# Patient Record
Sex: MALE | Race: WHITE | NOT HISPANIC OR LATINO | URBAN - NONMETROPOLITAN AREA
[De-identification: names, ages, dates, MRNs, and addresses within clinical notes are randomized per-mention and may not be internally consistent; named-entity substitution may affect disease eponyms.]

---

## 2020-12-02 ENCOUNTER — OFFICE VISIT (OUTPATIENT)
Dept: URBAN - NONMETROPOLITAN AREA CLINIC 2 | Facility: CLINIC | Age: 75
End: 2020-12-02
Payer: MEDICARE

## 2020-12-02 DIAGNOSIS — Z12.11 COLON CANCER SCREENING: ICD-10-CM

## 2020-12-02 DIAGNOSIS — K22.70 BARRETT ESOPHAGUS: ICD-10-CM

## 2020-12-02 PROCEDURE — 1036F TOBACCO NON-USER: CPT | Performed by: INTERNAL MEDICINE

## 2020-12-02 PROCEDURE — 99213 OFFICE O/P EST LOW 20 MIN: CPT | Performed by: INTERNAL MEDICINE

## 2020-12-02 PROCEDURE — G8482 FLU IMMUNIZE ORDER/ADMIN: HCPCS | Performed by: INTERNAL MEDICINE

## 2020-12-02 RX ORDER — AMLODIPINE BESYLATE 2.5 MG/1
TABLET ORAL
Qty: 0 | Refills: 0 | Status: ACTIVE | COMMUNITY
Start: 1900-01-01

## 2020-12-02 RX ORDER — OMEPRAZOLE 20 MG/1
TAKE 1 CAPSULE BY ORAL ROUTE 2 TIMES A DAY FOR 90 DAYS CAPSULE, DELAYED RELEASE ORAL 2
Qty: 180 | Refills: 3 | Status: ACTIVE | COMMUNITY
Start: 2020-03-02 | End: 2021-02-25

## 2020-12-02 NOTE — HPI-TODAY'S VISIT:
Mr. Rogers returns for follow-up today.  He was scheduled for upper endoscopy earlier this year for surveillance of Flores's esophagus.  This was delayed because of Covid.  He remains on omeprazole.  He is doing well.  He denies any dysphagia.  He has a history of RFA by Dr. Gil at [place].  In addition he states he is also due for colonoscopy.  This was last done 10 to 12 years ago.

## 2020-12-21 ENCOUNTER — OFFICE VISIT (OUTPATIENT)
Dept: URBAN - NONMETROPOLITAN AREA SURGERY CENTER 1 | Facility: SURGERY CENTER | Age: 75
End: 2020-12-21

## 2021-01-14 ENCOUNTER — OFFICE VISIT (OUTPATIENT)
Dept: URBAN - NONMETROPOLITAN AREA CLINIC 2 | Facility: CLINIC | Age: 76
End: 2021-01-14

## 2021-05-06 ENCOUNTER — TELEPHONE ENCOUNTER (OUTPATIENT)
Dept: URBAN - METROPOLITAN AREA SURGERY CENTER 30 | Facility: SURGERY CENTER | Age: 76
End: 2021-05-06

## 2021-05-06 RX ORDER — OMEPRAZOLE 20 MG/1
TAKE 1 CAPSULE BY ORAL ROUTE 2 TIMES A DAY FOR 90 DAYS CAPSULE, DELAYED RELEASE ORAL TWICE A DAY
Qty: 180 | Refills: 3
Start: 2020-03-02

## 2021-06-14 ENCOUNTER — OFFICE VISIT (OUTPATIENT)
Dept: URBAN - NONMETROPOLITAN AREA SURGERY CENTER 1 | Facility: SURGERY CENTER | Age: 76
End: 2021-06-14
Payer: MEDICARE

## 2021-06-14 ENCOUNTER — CLAIMS CREATED FROM THE CLAIM WINDOW (OUTPATIENT)
Dept: URBAN - METROPOLITAN AREA CLINIC 4 | Facility: CLINIC | Age: 76
End: 2021-06-14
Payer: MEDICARE

## 2021-06-14 DIAGNOSIS — K22.70 BARRETT ESOPHAGUS: ICD-10-CM

## 2021-06-14 DIAGNOSIS — K22.719 BARRETT'S ESOPHAGUS WITH DYSPLASIA, UNSPECIFIED: ICD-10-CM

## 2021-06-14 DIAGNOSIS — Z12.11 COLON CANCER SCREENING: ICD-10-CM

## 2021-06-14 PROCEDURE — G0121 COLON CA SCRN NOT HI RSK IND: HCPCS | Performed by: INTERNAL MEDICINE

## 2021-06-14 PROCEDURE — 88313 SPECIAL STAINS GROUP 2: CPT | Performed by: PATHOLOGY

## 2021-06-14 PROCEDURE — G8907 PT DOC NO EVENTS ON DISCHARG: HCPCS | Performed by: INTERNAL MEDICINE

## 2021-06-14 PROCEDURE — 88305 TISSUE EXAM BY PATHOLOGIST: CPT | Performed by: PATHOLOGY

## 2021-06-14 PROCEDURE — 43239 EGD BIOPSY SINGLE/MULTIPLE: CPT | Performed by: INTERNAL MEDICINE

## 2022-06-07 ENCOUNTER — TELEPHONE ENCOUNTER (OUTPATIENT)
Dept: URBAN - NONMETROPOLITAN AREA CLINIC 2 | Facility: CLINIC | Age: 77
End: 2022-06-07

## 2022-06-07 RX ORDER — OMEPRAZOLE 20 MG/1
1 CAPSULE 30 MINUTES BEFORE MEALS CAPSULE, DELAYED RELEASE ORAL TWICE A DAY
Qty: 180 | Refills: 3
Start: 2020-03-02

## 2022-08-10 ENCOUNTER — WEB ENCOUNTER (OUTPATIENT)
Dept: URBAN - NONMETROPOLITAN AREA CLINIC 13 | Facility: CLINIC | Age: 77
End: 2022-08-10

## 2022-08-16 ENCOUNTER — WEB ENCOUNTER (OUTPATIENT)
Dept: URBAN - NONMETROPOLITAN AREA CLINIC 13 | Facility: CLINIC | Age: 77
End: 2022-08-16

## 2022-08-16 ENCOUNTER — DASHBOARD ENCOUNTERS (OUTPATIENT)
Age: 77
End: 2022-08-16

## 2022-08-16 ENCOUNTER — OFFICE VISIT (OUTPATIENT)
Dept: URBAN - NONMETROPOLITAN AREA CLINIC 13 | Facility: CLINIC | Age: 77
End: 2022-08-16
Payer: MEDICARE

## 2022-08-16 VITALS
SYSTOLIC BLOOD PRESSURE: 167 MMHG | BODY MASS INDEX: 23.08 KG/M2 | HEIGHT: 66 IN | DIASTOLIC BLOOD PRESSURE: 81 MMHG | HEART RATE: 71 BPM | WEIGHT: 143.6 LBS

## 2022-08-16 DIAGNOSIS — K22.70 BARRETT ESOPHAGUS: ICD-10-CM

## 2022-08-16 DIAGNOSIS — Z12.11 COLON CANCER SCREENING: ICD-10-CM

## 2022-08-16 PROCEDURE — 99213 OFFICE O/P EST LOW 20 MIN: CPT | Performed by: NURSE PRACTITIONER

## 2022-08-16 RX ORDER — AMLODIPINE BESYLATE 2.5 MG/1
TABLET ORAL
Qty: 0 | Refills: 0 | Status: ACTIVE | COMMUNITY
Start: 1900-01-01

## 2022-08-16 RX ORDER — OMEPRAZOLE 20 MG/1
1 CAPSULE 30 MINUTES BEFORE MEALS CAPSULE, DELAYED RELEASE ORAL TWICE A DAY
Qty: 180 | Refills: 3
Start: 2020-03-02

## 2022-08-16 RX ORDER — OMEPRAZOLE 20 MG/1
1 CAPSULE 30 MINUTES BEFORE MEALS CAPSULE, DELAYED RELEASE ORAL TWICE A DAY
Qty: 180 | Refills: 3 | Status: ACTIVE | COMMUNITY
Start: 2020-03-02

## 2022-08-16 NOTE — HPI-TODAY'S VISIT:
8/16/2022 Mr. Rogers is here for f/u of Flores's esophagus. He was last seen in 2020. He has a history of RFA by Dr. Gil at [place]. He had an EGD 6/2021 with metaplasia on bx. He denies any reflux or dysphagia. He remains on omeprazoel daily. Overall, he feels well and has no GI complaints. CS

## 2022-08-16 NOTE — HPI-OTHER HISTORIES
12/2/2020 Mr. Rogers returns for follow-up today.  He was scheduled for upper endoscopy earlier this year for surveillance of Flores's esophagus.  This was delayed because of Covid.  He remains on omeprazole.  He is doing well.  He denies any dysphagia.  He has a history of RFA by Dr. Gil at [place].  In addition he states he is also due for colonoscopy.  This was last done 10 to 12 years ago.  6/14/2021 Colonoscopy: excessive looping, small and large mouth diverticula in the sigmoid and descending colon. EGD: medium hiatal hernia, No visual evidence of Barretts. Path with focal instestinal metaplasia

## 2023-08-01 ENCOUNTER — TELEPHONE ENCOUNTER (OUTPATIENT)
Dept: URBAN - NONMETROPOLITAN AREA CLINIC 2 | Facility: CLINIC | Age: 78
End: 2023-08-01

## 2023-08-01 RX ORDER — OMEPRAZOLE 20 MG/1
1 CAPSULE 30 MINUTES BEFORE MEALS CAPSULE, DELAYED RELEASE ORAL TWICE A DAY
Qty: 180 | Refills: 3
Start: 2020-03-02

## 2023-08-14 ENCOUNTER — TELEPHONE ENCOUNTER (OUTPATIENT)
Dept: URBAN - NONMETROPOLITAN AREA CLINIC 13 | Facility: CLINIC | Age: 78
End: 2023-08-14

## 2023-08-14 RX ORDER — OMEPRAZOLE 20 MG/1
1 CAPSULE 30 MINUTES BEFORE MEALS CAPSULE, DELAYED RELEASE ORAL TWICE A DAY
Qty: 180 | Refills: 3
Start: 2020-03-02

## 2024-07-17 ENCOUNTER — TELEPHONE ENCOUNTER (OUTPATIENT)
Dept: URBAN - NONMETROPOLITAN AREA CLINIC 2 | Facility: CLINIC | Age: 79
End: 2024-07-17

## 2024-07-17 RX ORDER — OMEPRAZOLE 20 MG/1
1 CAPSULE 30 MINUTES BEFORE MEALS CAPSULE, DELAYED RELEASE ORAL TWICE A DAY
Qty: 180 | Refills: 3
Start: 2020-03-02

## 2024-09-19 ENCOUNTER — LAB OUTSIDE AN ENCOUNTER (OUTPATIENT)
Dept: URBAN - NONMETROPOLITAN AREA CLINIC 13 | Facility: CLINIC | Age: 79
End: 2024-09-19

## 2024-09-19 ENCOUNTER — OFFICE VISIT (OUTPATIENT)
Dept: URBAN - NONMETROPOLITAN AREA CLINIC 13 | Facility: CLINIC | Age: 79
End: 2024-09-19
Payer: MEDICARE

## 2024-09-19 VITALS
HEART RATE: 83 BPM | HEIGHT: 66 IN | SYSTOLIC BLOOD PRESSURE: 168 MMHG | WEIGHT: 141 LBS | BODY MASS INDEX: 22.66 KG/M2 | DIASTOLIC BLOOD PRESSURE: 80 MMHG

## 2024-09-19 DIAGNOSIS — Z12.11 COLON CANCER SCREENING: ICD-10-CM

## 2024-09-19 DIAGNOSIS — K22.70 BARRETT ESOPHAGUS: ICD-10-CM

## 2024-09-19 PROCEDURE — 99214 OFFICE O/P EST MOD 30 MIN: CPT | Performed by: NURSE PRACTITIONER

## 2024-09-19 RX ORDER — AMLODIPINE BESYLATE 2.5 MG/1
TABLET ORAL
Qty: 0 | Refills: 0 | Status: ACTIVE | COMMUNITY
Start: 1900-01-01

## 2024-09-19 RX ORDER — OMEPRAZOLE 20 MG/1
1 CAPSULE 30 MINUTES BEFORE MEALS CAPSULE, DELAYED RELEASE ORAL TWICE A DAY
Qty: 180 | Refills: 3

## 2024-09-19 RX ORDER — OMEPRAZOLE 20 MG/1
1 CAPSULE 30 MINUTES BEFORE MEALS CAPSULE, DELAYED RELEASE ORAL TWICE A DAY
Qty: 180 | Refills: 3 | Status: ACTIVE | COMMUNITY
Start: 2020-03-02

## 2024-09-19 NOTE — HPI-OTHER HISTORIES
12/2/2020 Mr. Rogers returns for follow-up today.  He was scheduled for upper endoscopy earlier this year for surveillance of Flores's esophagus.  This was delayed because of Covid.  He remains on omeprazole.  He is doing well.  He denies any dysphagia.  He has a history of RFA by Dr. Gil at [place].  In addition he states he is also due for colonoscopy.  This was last done 10 to 12 years ago.  6/14/2021 Colonoscopy: excessive looping, small and large mouth diverticula in the sigmoid and descending colon. EGD: medium hiatal hernia, No visual evidence of Barretts. Path with focal instestinal metaplasia  8/16/2022 Mr. Rogers is here for f/u of Flores's esophagus. He was last seen in 2020. He has a history of RFA by Dr. Gil at [place]. He had an EGD 6/2021 with metaplasia on bx. He denies any reflux or dysphagia. He remains on omeprazoel daily. Overall, he feels well and has no GI complaints. CS

## 2024-09-19 NOTE — HPI-TODAY'S VISIT:
9/19/2024 Mr. Rogers is here for f/u of Flores's esophagus. He was last seen in 2022. He has a history of RFA by Dr. Gil at [place]. He had an EGD 6/2021 with metaplasia on bx. He denies any reflux or dysphagia. He remains on omeprazole BID. Overall, he feels well and has no GI complaints. He had prostate cancer but they are just watching it. CS

## 2024-10-01 ENCOUNTER — CLAIMS CREATED FROM THE CLAIM WINDOW (OUTPATIENT)
Dept: URBAN - METROPOLITAN AREA CLINIC 4 | Facility: CLINIC | Age: 79
End: 2024-10-01
Payer: MEDICARE

## 2024-10-01 ENCOUNTER — CLAIMS CREATED FROM THE CLAIM WINDOW (OUTPATIENT)
Dept: URBAN - NONMETROPOLITAN AREA SURGERY CENTER 1 | Facility: SURGERY CENTER | Age: 79
End: 2024-10-01
Payer: MEDICARE

## 2024-10-01 DIAGNOSIS — K22.89 OTHER SPECIFIED DISEASE OF ESOPHAGUS: ICD-10-CM

## 2024-10-01 DIAGNOSIS — K44.9 DIAPHRAGMATIC HERNIA: ICD-10-CM

## 2024-10-01 DIAGNOSIS — K44.9 HIATAL HERNIA: ICD-10-CM

## 2024-10-01 DIAGNOSIS — Z87.19 HISTORY OF BARRETT'S ESOPHAGUS: ICD-10-CM

## 2024-10-01 DIAGNOSIS — K22.70 BARRETT ESOPHAGUS: ICD-10-CM

## 2024-10-01 PROCEDURE — 88313 SPECIAL STAINS GROUP 2: CPT | Performed by: PATHOLOGY

## 2024-10-01 PROCEDURE — 43239 EGD BIOPSY SINGLE/MULTIPLE: CPT | Performed by: CLINIC/CENTER

## 2024-10-01 PROCEDURE — 00731 ANES UPR GI NDSC PX NOS: CPT | Performed by: NURSE ANESTHETIST, CERTIFIED REGISTERED

## 2024-10-01 PROCEDURE — 88305 TISSUE EXAM BY PATHOLOGIST: CPT | Performed by: PATHOLOGY

## 2024-10-01 PROCEDURE — 43239 EGD BIOPSY SINGLE/MULTIPLE: CPT | Performed by: INTERNAL MEDICINE

## 2024-11-07 ENCOUNTER — OFFICE VISIT (OUTPATIENT)
Dept: URBAN - NONMETROPOLITAN AREA CLINIC 13 | Facility: CLINIC | Age: 79
End: 2024-11-07
Payer: MEDICARE

## 2024-11-07 VITALS
HEART RATE: 88 BPM | DIASTOLIC BLOOD PRESSURE: 85 MMHG | BODY MASS INDEX: 22.82 KG/M2 | WEIGHT: 142 LBS | HEIGHT: 66 IN | SYSTOLIC BLOOD PRESSURE: 131 MMHG

## 2024-11-07 DIAGNOSIS — Z12.11 COLON CANCER SCREENING: ICD-10-CM

## 2024-11-07 DIAGNOSIS — K22.70 BARRETT ESOPHAGUS: ICD-10-CM

## 2024-11-07 DIAGNOSIS — K21.9 GERD WITHOUT ESOPHAGITIS: ICD-10-CM

## 2024-11-07 PROBLEM — 266435005: Status: ACTIVE | Noted: 2024-11-07

## 2024-11-07 PROCEDURE — 99213 OFFICE O/P EST LOW 20 MIN: CPT | Performed by: NURSE PRACTITIONER

## 2024-11-07 RX ORDER — OMEPRAZOLE 20 MG/1
1 CAPSULE 30 MINUTES BEFORE MEALS CAPSULE, DELAYED RELEASE ORAL TWICE A DAY
Qty: 180 | Refills: 3

## 2024-11-07 RX ORDER — OMEPRAZOLE 20 MG/1
1 CAPSULE 30 MINUTES BEFORE MEALS CAPSULE, DELAYED RELEASE ORAL TWICE A DAY
Qty: 180 | Refills: 3 | Status: ACTIVE | COMMUNITY

## 2024-11-07 RX ORDER — AMLODIPINE BESYLATE 2.5 MG/1
TABLET ORAL
Qty: 0 | Refills: 0 | Status: ACTIVE | COMMUNITY
Start: 1900-01-01

## 2024-11-07 NOTE — HPI-OTHER HISTORIES
12/2/2020 Mr. Rogers returns for follow-up today.  He was scheduled for upper endoscopy earlier this year for surveillance of Flores's esophagus.  This was delayed because of Covid.  He remains on omeprazole.  He is doing well.  He denies any dysphagia.  He has a history of RFA by Dr. Gil at [place].  In addition he states he is also due for colonoscopy.  This was last done 10 to 12 years ago.  6/14/2021 Colonoscopy: excessive looping, small and large mouth diverticula in the sigmoid and descending colon. EGD: medium hiatal hernia, No visual evidence of Barretts. Path with focal instestinal metaplasia  8/16/2022 Mr. Rogers is here for f/u of Flores's esophagus. He was last seen in 2020. He has a history of RFA by Dr. Gil at [place]. He had an EGD 6/2021 with metaplasia on bx. He denies any reflux or dysphagia. He remains on omeprazoel daily. Overall, he feels well and has no GI complaints. CS  9/19/2024 Mr. Rogers is here for f/u of Flores's esophagus. He was last seen in 2022. He has a history of RFA by Dr. Gil at [place]. He had an EGD 6/2021 with metaplasia on bx. He denies any reflux or dysphagia. He remains on omeprazole BID. Overall, he feels well and has no GI complaints. He had prostate cancer but they are just watching it. CS  10/1/2024 EGD: normal esophagus, large hiatal hernia. Path negative for metaplasia

## 2024-11-07 NOTE — HPI-TODAY'S VISIT:
11/7/2024 Mr. Rogers is here for f/u of Flores's esophagus. He has a history of RFA by Dr. Gil at [place]. He had an EGD 6/2021 with metaplasia on bx. His repeat 10/2204 showed a normal esophagus with biopsies negative for metaplasia. He denies any reflux or dysphagia. He remains on omeprazole BID. He has prostate cancer but they are just watching it. His PSA has been around 10-12. Overall, he feels well. CS